# Patient Record
Sex: FEMALE | Race: OTHER | NOT HISPANIC OR LATINO | URBAN - METROPOLITAN AREA
[De-identification: names, ages, dates, MRNs, and addresses within clinical notes are randomized per-mention and may not be internally consistent; named-entity substitution may affect disease eponyms.]

---

## 2024-01-25 ENCOUNTER — INPATIENT (INPATIENT)
Facility: HOSPITAL | Age: 59
LOS: 1 days | Discharge: HOME | DRG: 149 | End: 2024-01-27
Attending: FAMILY MEDICINE | Admitting: STUDENT IN AN ORGANIZED HEALTH CARE EDUCATION/TRAINING PROGRAM
Payer: SELF-PAY

## 2024-01-25 VITALS
WEIGHT: 175.93 LBS | DIASTOLIC BLOOD PRESSURE: 86 MMHG | TEMPERATURE: 98 F | HEART RATE: 95 BPM | SYSTOLIC BLOOD PRESSURE: 155 MMHG | OXYGEN SATURATION: 100 % | RESPIRATION RATE: 18 BRPM

## 2024-01-25 DIAGNOSIS — R42 DIZZINESS AND GIDDINESS: ICD-10-CM

## 2024-01-25 LAB
ALBUMIN SERPL ELPH-MCNC: 4.3 G/DL — SIGNIFICANT CHANGE UP (ref 3.3–5.2)
ALP SERPL-CCNC: 81 U/L — SIGNIFICANT CHANGE UP (ref 40–120)
ALT FLD-CCNC: 20 U/L — SIGNIFICANT CHANGE UP
ANION GAP SERPL CALC-SCNC: 13 MMOL/L — SIGNIFICANT CHANGE UP (ref 5–17)
APTT BLD: 31.6 SEC — SIGNIFICANT CHANGE UP (ref 24.5–35.6)
AST SERPL-CCNC: 22 U/L — SIGNIFICANT CHANGE UP
BASOPHILS # BLD AUTO: 0.03 K/UL — SIGNIFICANT CHANGE UP (ref 0–0.2)
BASOPHILS NFR BLD AUTO: 0.5 % — SIGNIFICANT CHANGE UP (ref 0–2)
BILIRUB SERPL-MCNC: 0.4 MG/DL — SIGNIFICANT CHANGE UP (ref 0.4–2)
BUN SERPL-MCNC: 11.3 MG/DL — SIGNIFICANT CHANGE UP (ref 8–20)
CALCIUM SERPL-MCNC: 9.8 MG/DL — SIGNIFICANT CHANGE UP (ref 8.4–10.5)
CHLORIDE SERPL-SCNC: 99 MMOL/L — SIGNIFICANT CHANGE UP (ref 96–108)
CO2 SERPL-SCNC: 23 MMOL/L — SIGNIFICANT CHANGE UP (ref 22–29)
CREAT SERPL-MCNC: 0.72 MG/DL — SIGNIFICANT CHANGE UP (ref 0.5–1.3)
EGFR: 96 ML/MIN/1.73M2 — SIGNIFICANT CHANGE UP
EOSINOPHIL # BLD AUTO: 0.02 K/UL — SIGNIFICANT CHANGE UP (ref 0–0.5)
EOSINOPHIL NFR BLD AUTO: 0.3 % — SIGNIFICANT CHANGE UP (ref 0–6)
FLUAV AG NPH QL: SIGNIFICANT CHANGE UP
FLUBV AG NPH QL: SIGNIFICANT CHANGE UP
GLUCOSE SERPL-MCNC: 117 MG/DL — HIGH (ref 70–99)
HCT VFR BLD CALC: 38.5 % — SIGNIFICANT CHANGE UP (ref 34.5–45)
HGB BLD-MCNC: 13.5 G/DL — SIGNIFICANT CHANGE UP (ref 11.5–15.5)
IMM GRANULOCYTES NFR BLD AUTO: 0.3 % — SIGNIFICANT CHANGE UP (ref 0–0.9)
INR BLD: 1.05 RATIO — SIGNIFICANT CHANGE UP (ref 0.85–1.18)
LYMPHOCYTES # BLD AUTO: 2.27 K/UL — SIGNIFICANT CHANGE UP (ref 1–3.3)
LYMPHOCYTES # BLD AUTO: 35.2 % — SIGNIFICANT CHANGE UP (ref 13–44)
MCHC RBC-ENTMCNC: 31 PG — SIGNIFICANT CHANGE UP (ref 27–34)
MCHC RBC-ENTMCNC: 35.1 GM/DL — SIGNIFICANT CHANGE UP (ref 32–36)
MCV RBC AUTO: 88.5 FL — SIGNIFICANT CHANGE UP (ref 80–100)
MONOCYTES # BLD AUTO: 0.7 K/UL — SIGNIFICANT CHANGE UP (ref 0–0.9)
MONOCYTES NFR BLD AUTO: 10.9 % — SIGNIFICANT CHANGE UP (ref 2–14)
NEUTROPHILS # BLD AUTO: 3.4 K/UL — SIGNIFICANT CHANGE UP (ref 1.8–7.4)
NEUTROPHILS NFR BLD AUTO: 52.8 % — SIGNIFICANT CHANGE UP (ref 43–77)
PLATELET # BLD AUTO: 197 K/UL — SIGNIFICANT CHANGE UP (ref 150–400)
POTASSIUM SERPL-MCNC: 3.8 MMOL/L — SIGNIFICANT CHANGE UP (ref 3.5–5.3)
POTASSIUM SERPL-SCNC: 3.8 MMOL/L — SIGNIFICANT CHANGE UP (ref 3.5–5.3)
PROT SERPL-MCNC: 8.8 G/DL — HIGH (ref 6.6–8.7)
PROTHROM AB SERPL-ACNC: 11.6 SEC — SIGNIFICANT CHANGE UP (ref 9.5–13)
RBC # BLD: 4.35 M/UL — SIGNIFICANT CHANGE UP (ref 3.8–5.2)
RBC # FLD: 12.6 % — SIGNIFICANT CHANGE UP (ref 10.3–14.5)
RSV RNA NPH QL NAA+NON-PROBE: SIGNIFICANT CHANGE UP
SARS-COV-2 RNA SPEC QL NAA+PROBE: SIGNIFICANT CHANGE UP
SODIUM SERPL-SCNC: 135 MMOL/L — SIGNIFICANT CHANGE UP (ref 135–145)
WBC # BLD: 6.44 K/UL — SIGNIFICANT CHANGE UP (ref 3.8–10.5)
WBC # FLD AUTO: 6.44 K/UL — SIGNIFICANT CHANGE UP (ref 3.8–10.5)

## 2024-01-25 PROCEDURE — 71045 X-RAY EXAM CHEST 1 VIEW: CPT | Mod: 26

## 2024-01-25 PROCEDURE — 70496 CT ANGIOGRAPHY HEAD: CPT | Mod: 26,MA

## 2024-01-25 PROCEDURE — 99223 1ST HOSP IP/OBS HIGH 75: CPT

## 2024-01-25 PROCEDURE — 99285 EMERGENCY DEPT VISIT HI MDM: CPT

## 2024-01-25 PROCEDURE — 99053 MED SERV 10PM-8AM 24 HR FAC: CPT

## 2024-01-25 PROCEDURE — 93010 ELECTROCARDIOGRAM REPORT: CPT

## 2024-01-25 PROCEDURE — 0042T: CPT | Mod: MA

## 2024-01-25 PROCEDURE — 70450 CT HEAD/BRAIN W/O DYE: CPT | Mod: 26,MA

## 2024-01-25 PROCEDURE — 70498 CT ANGIOGRAPHY NECK: CPT | Mod: 26,MA

## 2024-01-25 RX ORDER — MECLIZINE HCL 12.5 MG
12.5 TABLET ORAL EVERY 6 HOURS
Refills: 0 | Status: DISCONTINUED | OUTPATIENT
Start: 2024-01-25 | End: 2024-01-27

## 2024-01-25 RX ORDER — MECLIZINE HCL 12.5 MG
25 TABLET ORAL ONCE
Refills: 0 | Status: COMPLETED | OUTPATIENT
Start: 2024-01-25 | End: 2024-01-25

## 2024-01-25 RX ORDER — ATORVASTATIN CALCIUM 80 MG/1
1 TABLET, FILM COATED ORAL
Refills: 0 | DISCHARGE

## 2024-01-25 RX ORDER — METOCLOPRAMIDE HCL 10 MG
10 TABLET ORAL ONCE
Refills: 0 | Status: COMPLETED | OUTPATIENT
Start: 2024-01-25 | End: 2024-01-25

## 2024-01-25 RX ORDER — ASPIRIN/CALCIUM CARB/MAGNESIUM 324 MG
81 TABLET ORAL DAILY
Refills: 0 | Status: DISCONTINUED | OUTPATIENT
Start: 2024-01-25 | End: 2024-01-27

## 2024-01-25 RX ORDER — ATORVASTATIN CALCIUM 80 MG/1
80 TABLET, FILM COATED ORAL AT BEDTIME
Refills: 0 | Status: DISCONTINUED | OUTPATIENT
Start: 2024-01-25 | End: 2024-01-27

## 2024-01-25 RX ORDER — ENOXAPARIN SODIUM 100 MG/ML
40 INJECTION SUBCUTANEOUS EVERY 24 HOURS
Refills: 0 | Status: DISCONTINUED | OUTPATIENT
Start: 2024-01-25 | End: 2024-01-27

## 2024-01-25 RX ORDER — INFLUENZA VIRUS VACCINE 15; 15; 15; 15 UG/.5ML; UG/.5ML; UG/.5ML; UG/.5ML
0.5 SUSPENSION INTRAMUSCULAR ONCE
Refills: 0 | Status: DISCONTINUED | OUTPATIENT
Start: 2024-01-25 | End: 2024-01-27

## 2024-01-25 RX ORDER — HYDROCHLOROTHIAZIDE 25 MG
1 TABLET ORAL
Refills: 0 | DISCHARGE

## 2024-01-25 RX ORDER — LOSARTAN POTASSIUM 100 MG/1
1 TABLET, FILM COATED ORAL
Refills: 0 | DISCHARGE

## 2024-01-25 RX ADMIN — ENOXAPARIN SODIUM 40 MILLIGRAM(S): 100 INJECTION SUBCUTANEOUS at 11:13

## 2024-01-25 RX ADMIN — Medication 25 MILLIGRAM(S): at 08:11

## 2024-01-25 RX ADMIN — ATORVASTATIN CALCIUM 80 MILLIGRAM(S): 80 TABLET, FILM COATED ORAL at 22:02

## 2024-01-25 RX ADMIN — Medication 81 MILLIGRAM(S): at 11:13

## 2024-01-25 RX ADMIN — Medication 10 MILLIGRAM(S): at 08:11

## 2024-01-25 NOTE — CONSULT NOTE ADULT - SUBJECTIVE AND OBJECTIVE BOX
[facility]                                        [facility] [place]                                  Cj Garrison, & Jacky                                      [address]                                           [place], [place], [address]                                                [phone]        CC: dizziness     HISTORY:  The patient is a 58y Female who developed dizziness which she describes as a lightheaded sensation rather than true vertigo.   She was noted to have markedly elevated blood pressure.   She states that she has a history of high blood pressure but does not take her medication as she does not like taking pills.   She presented to the ER where the stroke code was activated and Neurology called.     PAST MEDICAL & SURGICAL HISTORY:  Hypertension     MEDICATION PRIOR TO ADMISSION:  Not taking any medication.     MEDICATIONS  (STANDING):  aspirin enteric coated 81 milliGRAM(s) Oral daily  atorvastatin 80 milliGRAM(s) Oral at bedtime  enoxaparin Injectable 40 milliGRAM(s) SubCutaneous every 24 hours    Allergies  No Known Allergies    SOCIAL HISTORY:  Never smoker.     FAMILY HISTORY:  No known family history of stroke or seizure.     ROS:  Constitutional: The patient denies fevers or weight changes.  Neuro: As per HPI.  Eyes: Denies blurry vision.  Ears/nose/throat: Denies Tinnitus.   Cardiac: Denies chest pain. Denies palpitations.  Respiratory: Denies shortness of breath.  GI: Denies abdominal pain, nausea, or vomiting.  : Denies change in urinary pattern.  Integumentary: Denies rash.  Psych: Denies recent mood changes.  Heme: denies easy bleeding/bruising.    Exam:  Vital Signs Last 24 Hrs  T(C): 36.7 (25 Jan 2024 07:32), Max: 36.7 (25 Jan 2024 07:32)  T(F): 98 (25 Jan 2024 07:32), Max: 98 (25 Jan 2024 07:32)  HR: 95 (25 Jan 2024 07:32) (95 - 95)  BP: 155/86 (25 Jan 2024 07:32) (155/86 - 155/86)  RR: 18 (25 Jan 2024 07:32) (18 - 18)  SpO2: 100% (25 Jan 2024 07:32) (100% - 100%)    Parameters below as of 25 Jan 2024 07:32  Patient On (Oxygen Delivery Method): room air    General: NAD.   Carotid bruits absent.     Mental status: The patient is awake, alert, and fully oriented. There is no aphasia. Attention span is normal. Patient is aware of current events.     Cranial nerves: There is no papilledema. Pupils react symmetrically to light. There is no visual field deficit to confrontation. Extraocular motion is full with no nystagmus.  Facial sensation is intact. Facial musculature is symmetric. Palate elevates symmetrically. Tongue is midline.    Motor: There is normal bulk and tone.  Strength is 5/5 in the right arm and leg.   Strength is 5/5 in the left arm and leg.    Sensation: Intact to light touch and pin. There is no extinction to double simultaneous stimulation.    Reflexes: 2+ throughout and plantar responses are flexor.    Cerebellar: There is no dysmetria on finger to nose testing.    [facility] SS:   Date: 1/25/24  Time:   1a) Level of consciousness (0-3): 0  1b) Questions (0-2): 0  1c) Commands (0-2): 0  2  ) Gaze (0-2): 0  3  ) Visual field (0-3): 0  4  ) Facial palsy (0-3): 0  Motor  5a) Left arm (0-4): 0  5b) Right arm (0-4): 0  6a) Left leg (0-4): 0  6b) Right leg (0-4): 0  7  ) Ataxia (0-2): 0  Sensory  8  ) Sensory (0-2): 0  Speech  9  ) Language (0-3): 0  10) Dysarthria (0-2): 0  Extinction  11) Extinction/inattention (0-2): 0    Total score: 0    Prestroke Modified [place]: 0    (0: No symptoms and no disability.  1: No significant disability despite symptoms; able to carry out all usual duties and activities.  2: Slight disability; unable to carry out all previous activity but able to look after own affairs without assistance.  3: Moderate disability; requiring some help but able to walk without assistance.   4: Moderately severe disability; unable to walk without assistance and unable to attend to own bodily needs without assistance.  5: Severe disability; bedridden, incontinent and requiring constant nursing care and attention.   6: Dead. )     LABS:                         13.5   6.44  )-----------( 197      ( 25 Jan 2024 07:41 )             38.5       01-25    135  |  99  |  11.3  ----------------------------<  117<H>  3.8   |  23.0  |  0.72    Ca    9.8      25 Jan 2024 07:41    TPro  8.8<H>  /  Alb  4.3  /  TBili  0.4  /  DBili  x   /  AST  22  /  ALT  20  /  AlkPhos  81  01-25      PT/INR - ( 25 Jan 2024 07:41 )   PT: 11.6 sec;   INR: 1.05 ratio         PTT - ( 25 Jan 2024 07:41 )  PTT:31.6 sec    RADIOLOGY   CT head images reviewed (and concur with report): There is no acute pathology.    CT-A negative for stenosis or large vessel occlusion.  Incidental findings of:  Multiple subcentimeter nodules right greater than left superficial parotid lobes.   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

## 2024-01-25 NOTE — ED PROVIDER NOTE - CLINICAL SUMMARY MEDICAL DECISION MAKING FREE TEXT BOX
Patient is a 57 yo female with PMHx HTN, HLD presenting with dizziness and confusion since 7 PM last night. LWK 7 AM yesterday. Code stroke initiated. NIHSS 0.       Will check labs, r.o electrolyte abnormalities, CTH and angio as well as brain stroke to r.o acute bleed vs. occlusion, cxr viral swab to r.o URI vs. PNA, treat likely vertigo, reassess.

## 2024-01-25 NOTE — H&P ADULT - NSHPREVIEWOFSYSTEMS_GEN_ALL_CORE
CONSTITUTIONAL: no fevers, chills, night sweats, weight changes  HEENT: no vision changes or diplopia, no tinnitus, no sore throat  RESPIRATORY: denies dyspnea, sob, nocturnal cough, sputum or hemoptysis  CARDIOVASCULAR: no CP, palpitations or lower extremity edema  GI: no dysphagia, nausea, abd pain, constipation, diarrhea, stool change or blood in stool  : no dysuria or hematuria, no flank pain, no incontinence or urinary retention  MSK: no joint pain or swelling  INTEGUMENTARY: no rashes or lesions  NEUROLOGICAL: no HA, confusion, syncope, numbness, weakness, tremors, +dizziness  PSYCH: denies depression  ENDOCRINE: no polyuria, polydipsia, no temp intolerance, no tremors, no changes in skin, hair or nails  HEMATOLOGIC/LYMPHATIC: no lymph node enlargement, abnormal bruising or bleeding

## 2024-01-25 NOTE — ED ADULT NURSE NOTE - OBJECTIVE STATEMENT
Pt. states she has had sudden onset dizziness and stomach upset since yesterday. Pt. states she has been intermittently nauseous, currently appears to be in no acute distress. Pt. was activated code stroke upon arrival to ED, canceled by MD resident. Plan for MRI.

## 2024-01-25 NOTE — CONSULT NOTE ADULT - ASSESSMENT
The patient is a 58y Female with dizziness likely secondary to hypertension.    Dizziness   Now improved.   Likely secondary to poorly controlled blood pressure.   Agree with MRI brain to rule out stroke.     Hypertension   Control blood pressure.     Case discussed with ER team (Dr Balderas attending).

## 2024-01-25 NOTE — H&P ADULT - NSHPPHYSICALEXAM_GEN_ALL_CORE
GENERAL: pt examined bedside, laying comfortably in bed in NAD  HEENT: NC/AT, moist oral mucosa, clear conjunctiva, sclera nonicteric  RESPIRATORY: Normal respiratory effort, no wheezing, rhonchi, rales  CARDIOVASCULAR: RRR, normal S1 and S2  ABDOMEN: soft, NT/ND, +bowel sounds, no rebound/guarding  MSK: No joint deformities, edema, erythema  EXTREMITIES: No cynaosis, no clubbing, no lower extremity edema  PSYCH: affect appropriate and cooperative  NEUROLOGY: A+O to person, place, and time, CN II-XII intact, sensation intact, strength 5/5  SKIN: No rashes or no palpable lesions

## 2024-01-25 NOTE — ED PROVIDER NOTE - ATTENDING CONTRIBUTION TO CARE
59 yo female with PMHx HTN, HLD presenting with dizziness since 7 PM last night. felt room spinning and that she was going to lose her balance. This morning called her employer who felt she was not her normal in terms of walking and she is slower to answer questions. patient is aox3. 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 Denies headache, n/v, cp, sob, abd pain, numbness, tingling, weakness. Does not take her meds for HTN or HLD.  AP - NIH 0 here, no slurred speech, gait steady. code stroke called from triage for dizziness. f/u CT imaging.

## 2024-01-25 NOTE — PATIENT PROFILE ADULT - FALL HARM RISK - RISK INTERVENTIONS

## 2024-01-25 NOTE — H&P ADULT - NSHPLABSRESULTS_GEN_ALL_CORE
13.5   6.44  )-----------( 197      ( 25 Jan 2024 07:41 )             38.5         01-25    135  |  99  |  11.3  ----------------------------<  117<H>  3.8   |  23.0  |  0.72    Ca    9.8      25 Jan 2024 07:41    TPro  8.8<H>  /  Alb  4.3  /  TBili  0.4  /  DBili  x   /  AST  22  /  ALT  20  /  AlkPhos  81  01-25        < from: CT Angio Neck Stroke Protocol w/ IV Cont (01.25.24 @ 07:57) >    MPRESSION:    CT BRAIN  1. No evidence of acute hemorrhage or vasogenic edema.  2. Additional findings described in detail above.    CT PERFUSION  1. No predicted core infarct.  2. No evidence of active ischemia-tissue at risk.    CTA  1. Vertebral arteries patent, with left-sided dominance.  2. No evidence of significant stenosis, occlusion or dissection bilateral   extracranial carotid arteries.  3. No evidence of large vessel occlusion, definitive aneurysm or vascular   malformation intracranial circulation.  4. Additional findings described in detail above, including prominence of   the nasopharyngeal adenoidal tissue as well as multiple subcentimeter   nodules right greater than left parotid gland, possibly lymph nodes.   Recommend nonemergent ENT evaluation and ultrasound of the parotid glands   for further evaluation.    < end of copied text >

## 2024-01-25 NOTE — ED PROVIDER NOTE - OBJECTIVE STATEMENT
Patient is a 57 yo female with PMHx HTN, HLD presenting with dizziness and confusion since 7 PM last night. LWK 7 AM yesterday. Code stroke initiated.       Kenn ([phone]) Patient is a 59 yo female with PMHx HTN, HLD presenting with dizziness and confusion since 7 PM last night. LWK 7 AM yesterday. Code stroke initiated. 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 Patient developed dizziness with sudden room spinning last night at 7 PM. Patient was last noted to be at her baseline at 7 am yesterday. Patient appears disoriented as per Kenn. NIHSS 0. Denies fevers, chills, lightheadedness, dysphagia, dysarthria, diplopia, photophobia, syncope, cough, congestion, SOB, CP, abdominal pain, neck pain, back pain, diarrhea, dysuria, hematuria, hematochezia, hematemesis, n/v, recent travel, sick contacts, leg swelling.

## 2024-01-25 NOTE — H&P ADULT - HISTORY OF PRESENT ILLNESS
57 y/o w/ PMH of HTN and HLD, noncompliant with medications, presents for dizziness that started last night at 7pm w/ associated nausea and difficulty ambulating due to dizziness.  Pt reports last night her dizziness started suddenly and describes it as if the the room was spinning around her.  It was exacerbated by movement of her head.  She had to hold on to the walls while walking bc she felt as if she would fall over from the dizziness.  She states she has had similar episode 3 years ago but resolved within a few hours but this persisted.  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  He is at bedside and reports that when he got to her he noticed she was slow to respond to questions.  He took her BP and said sbp was in the 180's. and HR low 100's.  Pt denies vision/hearing changes, fevers, chills sick contacts, recent infection, dysuria, abd pain, cp, palpitations vomiting.

## 2024-01-25 NOTE — ED ADULT TRIAGE NOTE - CHIEF COMPLAINT QUOTE
sudden onset dizziness last night w/ nausea, elevated bp 180 at home per friend pt has been disoriented. code stroke activated. sudden onset dizziness last night w/ nausea, elevated bp 180 at home per friend she hasn't taken bp meds in a while.  pt has been disoriented. code stroke activated.

## 2024-01-25 NOTE — ED ADULT NURSE NOTE - CHIEF COMPLAINT QUOTE
sudden onset dizziness last night w/ nausea, elevated bp 180 at home per friend she hasn't taken bp meds in a while.  pt has been disoriented. code stroke activated.

## 2024-01-25 NOTE — H&P ADULT - ASSESSMENT
59 y/o w/ PMH of HTN and HLD, noncompliant with medications, presents for dizziness that started last night at 7pm w/ associated nausea and difficulty ambulating due to dizziness.  Pt reports last night her dizziness started suddenly and describes it as if the the room was spinning around her, exacerbated by movement of her head.  She had to hold on to the walls while walking bc she felt as if she would fall over from the dizziness.  Employer at bedside states she was slow to answer questions and her SBP was in the 180's.  Pt was a code stroke on arrival to ED.  SBP in the 150's and symptoms had resolved.  Stroke team was notified and recommended admission but for general neurology to follow.  Will admit to r/o CVA.      Admit to telemetry       Dizziness, peripheral vs central   - Pt symptoms have resolved and no focal deficits on exam  - Could also be BPPV given report of spinning sensation w/ movement of head   - However given reports of slow to respond questions and falling over to one side while ambulating will order MRI brain to r/o CVA  - CTH negative and CTA h/n and perfusion study negative   - Pt reports noncompliance w/ medications bc she fells fine and doesn't like having to take meds  - Hold BP meds for now to allow for permissive HTN for 48-72hrs  - TTE w/ bubble study ordered  - Started on high intensity statin and ASA  - f/u lipid panel and a1c  - VS and neuro checks q4h  - Fall and aspiration precautions   - Monitor on telemetry   - Neurology consulted       HTN / HLD  - On losartan and HCTZ at home but held to allow for permissive HTN  - On low dose statin at home but increased to high intensity for now pending above w/u      Incidental CT finding  - Reported to have prominence of nasopharyngeal adenoidal tissue and multiple subcentimeter nodules, R>L parotid gland   - Follow up outpt w/ ENT for nonemergent evaluation and f/u with PMD for nonemergent US of parotid glands         VTE ppx: lovenox            59 y/o w/ PMH of HTN and HLD, noncompliant with medications, presents for dizziness that started last night at 7pm w/ associated nausea and difficulty ambulating due to dizziness.  Pt reports last night her dizziness started suddenly and describes it as if the the room was spinning around her, exacerbated by movement of her head.  She had to hold on to the walls while walking bc she felt as if she would fall over from the dizziness.  Employer at bedside states she was slow to answer questions and her SBP was in the 180's.  Pt was a code stroke on arrival to ED.  SBP in the 150's and symptoms had resolved.  Stroke team was notified and recommended admission but for general neurology to follow.  Will admit to r/o CVA.      Admit to telemetry       Dizziness, peripheral vs central   - Pt symptoms have resolved and no focal deficits on exam  - Could also be BPPV given report of spinning sensation w/ movement of head   - However given reports of slow to respond questions, falling over to one side while ambulating and poor bp control will order MRI brain to r/o CVA  - CTH negative and CTA h/n and perfusion study negative   - Pt reports noncompliance w/ medications bc she fells fine and doesn't like having to take meds  - Hold BP meds for now to allow for permissive HTN for 48-72hrs  - TTE w/ bubble study ordered  - Started on high intensity statin and ASA  - f/u lipid panel and a1c  - VS and neuro checks q4h  - Fall and aspiration precautions   - Monitor on telemetry   - Neurology consulted       HTN / HLD  - On losartan and HCTZ at home but held to allow for permissive HTN  - On low dose statin at home but increased to high intensity for now pending above w/u      Incidental CT finding  - Reported to have prominence of nasopharyngeal adenoidal tissue and multiple subcentimeter nodules, R>L parotid gland   - Follow up outpt w/ ENT for nonemergent evaluation and f/u with PMD for nonemergent US of parotid glands         VTE ppx: lovenox

## 2024-01-25 NOTE — ED PROVIDER NOTE - PROGRESS NOTE DETAILS
JK - patient labs, CT scans WNL. Code stroke neurology consulted, Dr. Davidson recommended neurology consult. Neurology consulted. Patient NIHSS 0, however as per family friend patient still disoriented and having trouble ambulating. Will admit to medicine for further work up, neuro recs. VSS.

## 2024-01-26 LAB
A1C WITH ESTIMATED AVERAGE GLUCOSE RESULT: 5.6 % — SIGNIFICANT CHANGE UP (ref 4–5.6)
ANION GAP SERPL CALC-SCNC: 15 MMOL/L — SIGNIFICANT CHANGE UP (ref 5–17)
BUN SERPL-MCNC: 12.4 MG/DL — SIGNIFICANT CHANGE UP (ref 8–20)
CALCIUM SERPL-MCNC: 9.3 MG/DL — SIGNIFICANT CHANGE UP (ref 8.4–10.5)
CHLORIDE SERPL-SCNC: 104 MMOL/L — SIGNIFICANT CHANGE UP (ref 96–108)
CHOLEST SERPL-MCNC: 190 MG/DL — SIGNIFICANT CHANGE UP
CO2 SERPL-SCNC: 21 MMOL/L — LOW (ref 22–29)
CREAT SERPL-MCNC: 0.87 MG/DL — SIGNIFICANT CHANGE UP (ref 0.5–1.3)
EGFR: 77 ML/MIN/1.73M2 — SIGNIFICANT CHANGE UP
ESTIMATED AVERAGE GLUCOSE: 114 MG/DL — SIGNIFICANT CHANGE UP (ref 68–114)
GLUCOSE SERPL-MCNC: 98 MG/DL — SIGNIFICANT CHANGE UP (ref 70–99)
HCT VFR BLD CALC: 38.8 % — SIGNIFICANT CHANGE UP (ref 34.5–45)
HCV AB S/CO SERPL IA: 0.12 S/CO — SIGNIFICANT CHANGE UP (ref 0–0.99)
HCV AB SERPL-IMP: SIGNIFICANT CHANGE UP
HDLC SERPL-MCNC: 39 MG/DL — LOW
HGB BLD-MCNC: 13.3 G/DL — SIGNIFICANT CHANGE UP (ref 11.5–15.5)
LIPID PNL WITH DIRECT LDL SERPL: 136 MG/DL — HIGH
MCHC RBC-ENTMCNC: 31.4 PG — SIGNIFICANT CHANGE UP (ref 27–34)
MCHC RBC-ENTMCNC: 34.3 GM/DL — SIGNIFICANT CHANGE UP (ref 32–36)
MCV RBC AUTO: 91.5 FL — SIGNIFICANT CHANGE UP (ref 80–100)
NON HDL CHOLESTEROL: 151 MG/DL — HIGH
PLATELET # BLD AUTO: 183 K/UL — SIGNIFICANT CHANGE UP (ref 150–400)
POTASSIUM SERPL-MCNC: 4.1 MMOL/L — SIGNIFICANT CHANGE UP (ref 3.5–5.3)
POTASSIUM SERPL-SCNC: 4.1 MMOL/L — SIGNIFICANT CHANGE UP (ref 3.5–5.3)
RBC # BLD: 4.24 M/UL — SIGNIFICANT CHANGE UP (ref 3.8–5.2)
RBC # FLD: 13 % — SIGNIFICANT CHANGE UP (ref 10.3–14.5)
SODIUM SERPL-SCNC: 140 MMOL/L — SIGNIFICANT CHANGE UP (ref 135–145)
TRIGL SERPL-MCNC: 76 MG/DL — SIGNIFICANT CHANGE UP
WBC # BLD: 7.18 K/UL — SIGNIFICANT CHANGE UP (ref 3.8–10.5)
WBC # FLD AUTO: 7.18 K/UL — SIGNIFICANT CHANGE UP (ref 3.8–10.5)

## 2024-01-26 PROCEDURE — 70551 MRI BRAIN STEM W/O DYE: CPT | Mod: 26

## 2024-01-26 PROCEDURE — 93306 TTE W/DOPPLER COMPLETE: CPT | Mod: 26

## 2024-01-26 RX ORDER — MECLIZINE HCL 12.5 MG
1 TABLET ORAL
Qty: 90 | Refills: 0
Start: 2024-01-26 | End: 2024-02-24

## 2024-01-26 RX ORDER — ASPIRIN/CALCIUM CARB/MAGNESIUM 324 MG
1 TABLET ORAL
Qty: 30 | Refills: 0
Start: 2024-01-26 | End: 2024-02-24

## 2024-01-26 RX ADMIN — Medication 81 MILLIGRAM(S): at 12:40

## 2024-01-26 RX ADMIN — ATORVASTATIN CALCIUM 80 MILLIGRAM(S): 80 TABLET, FILM COATED ORAL at 22:40

## 2024-01-26 NOTE — DISCHARGE NOTE PROVIDER - ATTENDING DISCHARGE PHYSICAL EXAMINATION:
GENERAL: pt examined bedside, laying comfortably in bed in NAD  HEENT: NC/AT, moist oral mucosa, clear conjunctiva, sclera nonicteric  RESPIRATORY: Normal respiratory effort, no wheezing, rhonchi, rales  CARDIOVASCULAR: RRR, normal S1 and S2  ABDOMEN: soft, NT/ND, +bowel sounds, no rebound/guarding  MSK: No joint deformities, edema, erythema  EXTREMITIES: No cynaosis, no clubbing, no lower extremity edema  PSYCH: affect appropriate and cooperative  NEUROLOGY: A+O to person, place, and time, CN II-XII intact, sensation intact, strength 5/5  SKIN: No rashes or no palpable lesions   T(C): 36.8 (01-27-24 @ 07:44), Max: 36.8 (01-27-24 @ 07:44)  HR: 83 (01-27-24 @ 07:44) (73 - 90)  BP: 132/82 (01-27-24 @ 07:44) (103/66 - 132/82)  RR: 18 (01-27-24 @ 07:44) (18 - 18)  SpO2: 98% (01-27-24 @ 07:44) (97% - 99%)    GEN - NAD  HEENT - NCAT, EOMI, CATHLEEN,   RESP - CTA BL, no wheeze//rhonchi/crackles. not on supplemental O2.  CARDIO - NS1S2, RRR. No murmurs  ABD - Soft/Non tender/Non distended. Normal BS x4 quadrants.   Ext - No HOME. no signs of venous/arterial stasis ulcers  MSK - full ROM of BL upper and lower extremities without pain or restriction. BL 5/5 strength on upper and lower extremities.   Neuro - cn 2-12 grossly intact. . gait not observed.    Psych- AAOx3. . attentive. normal affect.

## 2024-01-26 NOTE — DISCHARGE NOTE PROVIDER - NSDCCPCAREPLAN_GEN_ALL_CORE_FT
PRINCIPAL DISCHARGE DIAGNOSIS  Diagnosis: Dizziness  Assessment and Plan of Treatment:       SECONDARY DISCHARGE DIAGNOSES  Diagnosis: HTN (hypertension)  Assessment and Plan of Treatment:     Diagnosis: Non compliance w medication regimen  Assessment and Plan of Treatment:

## 2024-01-26 NOTE — DISCHARGE NOTE PROVIDER - POSTFACE STATEMENT FOR MINUTES SPENT
Airway patent, Nasal mucosa clear. Mouth with normal mucosa. Throat has no vesicles, no oropharyngeal exudates and uvula is midline. minutes on the discharge service.

## 2024-01-26 NOTE — DISCHARGE NOTE PROVIDER - HOSPITAL COURSE
59 y/o w/ PMH of HTN and HLD, noncompliant with medications, presents for dizziness that started last night at 7pm w/ associated nausea and difficulty ambulating due to dizziness.  Pt reports last night her dizziness started suddenly and describes it as if the the room was spinning around her, exacerbated by movement of her head.  She had to hold on to the walls while walking bc she felt as if she would fall over from the dizziness.  Employer at bedside states she was slow to answer questions and her SBP was in the 180's.  Pt was a code stroke on arrival to ED.  SBP in the 150's and symptoms had resolved.  Stroke team was notified and recommended admission but for general neurology to follow.  Will admit to r/o CVA.      Admit to telemetry       Dizziness, peripheral vs central   - Pt symptoms have resolved and no focal deficits on exam  - Could also be BPPV given report of spinning sensation w/ movement of head   - However given reports of slow to respond questions, falling over to one side while ambulating and poor bp control will order MRI brain to r/o CVA  - CTH negative and CTA h/n and perfusion study negative   - Pt reports noncompliance w/ medications bc she fells fine and doesn't like having to take meds  - Hold BP meds for now to allow for permissive HTN for 48-72hrs  - TTE w/ bubble study ordered  - Started on high intensity statin and ASA  - f/u lipid panel and a1c  - VS and neuro checks q4h  - Fall and aspiration precautions   - Monitor on telemetry   - Neurology consulted       HTN / HLD  - On losartan and HCTZ at home but held to allow for permissive HTN  - On low dose statin at home but increased to high intensity for now pending above w/u      Incidental CT finding  - Reported to have prominence of nasopharyngeal adenoidal tissue and multiple subcentimeter nodules, R>L parotid gland   - Follow up outpt w/ ENT for nonemergent evaluation and f/u with PMD for nonemergent US of parotid glands 59 y/o w/ PMH of HTN and HLD, noncompliant with medications, presents for dizziness that started last night at 7pm w/ associated nausea and difficulty ambulating due to dizziness.  Pt reports last night her dizziness started suddenly and describes it as if the the room was spinning around her, exacerbated by movement of her head.  She had to hold on to the walls while walking bc she felt as if she would fall over from the dizziness.  Employer at bedside states she was slow to answer questions and her SBP was in the 180's.  Pt was a code stroke on arrival to ED.  SBP in the 150's and symptoms had resolved.  Stroke team was notified and admitted for mri head.     Dizziness, peripheral vs central   - Pt symptoms have resolved and no focal deficits on exam  - Could also be BPPV given report of spinning sensation w/ movement of head   - CTH negative and CTA h/n and perfusion study negative   - Pt reports noncompliance w/ medications bc she fells fine and doesn't like having to take meds  - TTE w/ bubble study ordered  neuro consulted  mri head negative      HTN / HLD  home meds    Incidental CT finding  - Reported to have prominence of nasopharyngeal adenoidal tissue and multiple subcentimeter nodules, R>L parotid gland   - Follow up outpt w/ ENT for nonemergent evaluation and f/u with PMD for nonemergent US of parotid glands 57 y/o w/ PMH of HTN and HLD, noncompliant with medications, presents for dizziness that started last night at 7pm w/ associated nausea and difficulty ambulating due to dizziness.  Pt reports last night her dizziness started suddenly and describes it as if the the room was spinning around her, exacerbated by movement of her head.  She had to hold on to the walls while walking bc she felt as if she would fall over from the dizziness.  Employer at bedside states she was slow to answer questions and her SBP was in the 180's.  Pt was a code stroke on arrival to ED. SBP in the 150's and symptoms had resolved. Stroke team was notified and admitted for mri head. MRI BRAIN STABLE.tte stable.Resolved dizziness.pt is ambulating w/o assist/symptoms.    Dizziness, likely peripheral   - Pt symptoms have resolved and no focal deficits on exam  - Could also be BPPV given report of spinning sensation w/ movement of head   - MRI brain/CTH negative and CTA h/n and perfusion study negative   - Pt reports noncompliance w/ medications bc she fells fine and doesn't like having to take meds  - TTE w/ bubble study stable  neuro consulted  mri head negative      HTN / HLD  home meds    Incidental CT finding  - Reported to have prominence of nasopharyngeal adenoidal tissue and multiple subcentimeter nodules, R>L parotid gland   - Follow up outpt w/ ENT for nonemergent evaluation and f/u with PMD for nonemergent US of parotid glands

## 2024-01-26 NOTE — DISCHARGE NOTE PROVIDER - NSDCMRMEDTOKEN_GEN_ALL_CORE_FT
aspirin 81 mg oral delayed release tablet: 1 tab(s) orally once a day  atorvastatin 10 mg oral tablet: 1 tab(s) orally once a day  hydroCHLOROthiazide 12.5 mg oral tablet: 1 tab(s) orally once a day  losartan 100 mg oral tablet: 1 tab(s) orally once a day  meclizine 12.5 mg oral tablet: 1 tab(s) orally every 8 hours as needed for Dizziness

## 2024-01-27 VITALS
DIASTOLIC BLOOD PRESSURE: 82 MMHG | OXYGEN SATURATION: 98 % | SYSTOLIC BLOOD PRESSURE: 132 MMHG | TEMPERATURE: 98 F | HEART RATE: 83 BPM | RESPIRATION RATE: 18 BRPM

## 2024-01-27 PROCEDURE — 86803 HEPATITIS C AB TEST: CPT

## 2024-01-27 PROCEDURE — 93005 ELECTROCARDIOGRAM TRACING: CPT

## 2024-01-27 PROCEDURE — 85730 THROMBOPLASTIN TIME PARTIAL: CPT

## 2024-01-27 PROCEDURE — 85610 PROTHROMBIN TIME: CPT

## 2024-01-27 PROCEDURE — 70551 MRI BRAIN STEM W/O DYE: CPT

## 2024-01-27 PROCEDURE — 71045 X-RAY EXAM CHEST 1 VIEW: CPT

## 2024-01-27 PROCEDURE — 99239 HOSP IP/OBS DSCHRG MGMT >30: CPT

## 2024-01-27 PROCEDURE — 93306 TTE W/DOPPLER COMPLETE: CPT

## 2024-01-27 PROCEDURE — 80053 COMPREHEN METABOLIC PANEL: CPT

## 2024-01-27 PROCEDURE — 85025 COMPLETE CBC W/AUTO DIFF WBC: CPT

## 2024-01-27 PROCEDURE — 80048 BASIC METABOLIC PNL TOTAL CA: CPT

## 2024-01-27 PROCEDURE — 36415 COLL VENOUS BLD VENIPUNCTURE: CPT

## 2024-01-27 PROCEDURE — 70496 CT ANGIOGRAPHY HEAD: CPT | Mod: MA

## 2024-01-27 PROCEDURE — 0042T: CPT | Mod: MA

## 2024-01-27 PROCEDURE — 85027 COMPLETE CBC AUTOMATED: CPT

## 2024-01-27 PROCEDURE — 70450 CT HEAD/BRAIN W/O DYE: CPT | Mod: MA

## 2024-01-27 PROCEDURE — 99285 EMERGENCY DEPT VISIT HI MDM: CPT

## 2024-01-27 PROCEDURE — 80061 LIPID PANEL: CPT

## 2024-01-27 PROCEDURE — 83036 HEMOGLOBIN GLYCOSYLATED A1C: CPT

## 2024-01-27 PROCEDURE — 87637 SARSCOV2&INF A&B&RSV AMP PRB: CPT

## 2024-01-27 PROCEDURE — 96374 THER/PROPH/DIAG INJ IV PUSH: CPT

## 2024-01-27 PROCEDURE — 70498 CT ANGIOGRAPHY NECK: CPT | Mod: MA

## 2024-01-27 RX ADMIN — ENOXAPARIN SODIUM 40 MILLIGRAM(S): 100 INJECTION SUBCUTANEOUS at 05:30

## 2024-01-27 NOTE — DISCHARGE NOTE NURSING/CASE MANAGEMENT/SOCIAL WORK - NSDCPEFALRISK_GEN_ALL_CORE
For information on Fall & Injury Prevention, visit: [email or web address] OR  [email or web address] OR  https://www.cdc.gov/steadi/patient.html

## 2024-01-27 NOTE — DISCHARGE NOTE NURSING/CASE MANAGEMENT/SOCIAL WORK - PATIENT PORTAL LINK FT
You can access the FollowMyHealth Patient Portal offered by [facility] by registering at the following website: [email or web address]. By joining [employer]’s FollowMyHealth portal, you will also be able to view your health information using other applications (apps) compatible with our system.

## 2025-06-19 NOTE — DISCHARGE NOTE PROVIDER - NSDCQMPCI_CARD_ALL_CORE
Incoming refill request on patient's medication: Normal Request.    No protocol for requested medication     Medication:     Outpatient Medication Detail     Disp Refills Start End    risperiDONE (RisperDAL) 3 MG tablet 90 tablet 0 2/24/2025 --    Sig - Route: Take 1 tablet by mouth daily. - Oral    Outpatient Medication Detail     Disp Refills Start End    venlafaxine XR (EFFEXOR XR) 75 MG 24 hr capsule 270 capsule 0 2/24/2025 --    Sig - Route: Take 3 capsules by mouth daily. - Oral      Last office visit date: 2/24/25  Pharmacy: [facility] [address] [place] [address]     Follow Up Recommendation: 3 Months.     No Show/Cancels in Last 12 Months: Yes 5/12/25    Next Visit Date: 6/30/2025    Routed To The Physician Only.      
No